# Patient Record
Sex: FEMALE | Race: BLACK OR AFRICAN AMERICAN | NOT HISPANIC OR LATINO | Employment: FULL TIME | URBAN - METROPOLITAN AREA
[De-identification: names, ages, dates, MRNs, and addresses within clinical notes are randomized per-mention and may not be internally consistent; named-entity substitution may affect disease eponyms.]

---

## 2023-03-09 LAB
ACTIVATED PARTIAL THROMBOPLASTIN TIME IN PPP BY COAGULATION ASSAY: 28 SEC (ref 26–39)
ALANINE AMINOTRANSFERASE (SGPT) (U/L) IN SER/PLAS: 10 U/L (ref 7–45)
ALBUMIN (G/DL) IN SER/PLAS: 3.8 G/DL (ref 3.4–5)
ALKALINE PHOSPHATASE (U/L) IN SER/PLAS: 65 U/L (ref 33–110)
ANION GAP IN SER/PLAS: 8 MMOL/L (ref 10–20)
ASPARTATE AMINOTRANSFERASE (SGOT) (U/L) IN SER/PLAS: 12 U/L (ref 9–39)
BASOPHILS (10*3/UL) IN BLOOD BY AUTOMATED COUNT: 0.01 X10E9/L (ref 0–0.1)
BASOPHILS/100 LEUKOCYTES IN BLOOD BY AUTOMATED COUNT: 0.2 % (ref 0–2)
BILIRUBIN TOTAL (MG/DL) IN SER/PLAS: 0.3 MG/DL (ref 0–1.2)
CALCIUM (MG/DL) IN SER/PLAS: 9 MG/DL (ref 8.6–10.3)
CARBON DIOXIDE, TOTAL (MMOL/L) IN SER/PLAS: 30 MMOL/L (ref 21–32)
CHLORIDE (MMOL/L) IN SER/PLAS: 102 MMOL/L (ref 98–107)
CREATININE (MG/DL) IN SER/PLAS: 0.66 MG/DL (ref 0.5–1.05)
EOSINOPHILS (10*3/UL) IN BLOOD BY AUTOMATED COUNT: 0.06 X10E9/L (ref 0–0.7)
EOSINOPHILS/100 LEUKOCYTES IN BLOOD BY AUTOMATED COUNT: 1 % (ref 0–6)
ERYTHROCYTE DISTRIBUTION WIDTH (RATIO) BY AUTOMATED COUNT: 15.1 % (ref 11.5–14.5)
ERYTHROCYTE MEAN CORPUSCULAR HEMOGLOBIN CONCENTRATION (G/DL) BY AUTOMATED: 31.6 G/DL (ref 32–36)
ERYTHROCYTE MEAN CORPUSCULAR VOLUME (FL) BY AUTOMATED COUNT: 80 FL (ref 80–100)
ERYTHROCYTES (10*6/UL) IN BLOOD BY AUTOMATED COUNT: 4.76 X10E12/L (ref 4–5.2)
GFR FEMALE: >90 ML/MIN/1.73M2
GLUCOSE (MG/DL) IN SER/PLAS: 94 MG/DL (ref 74–99)
HEMATOCRIT (%) IN BLOOD BY AUTOMATED COUNT: 38.3 % (ref 36–46)
HEMOGLOBIN (G/DL) IN BLOOD: 12.1 G/DL (ref 12–16)
IMMATURE GRANULOCYTES/100 LEUKOCYTES IN BLOOD BY AUTOMATED COUNT: 0.2 % (ref 0–0.9)
INR IN PPP BY COAGULATION ASSAY: 1.1 (ref 0.9–1.1)
LEUKOCYTES (10*3/UL) IN BLOOD BY AUTOMATED COUNT: 5.8 X10E9/L (ref 4.4–11.3)
LYMPHOCYTES (10*3/UL) IN BLOOD BY AUTOMATED COUNT: 2.89 X10E9/L (ref 1.2–4.8)
LYMPHOCYTES/100 LEUKOCYTES IN BLOOD BY AUTOMATED COUNT: 49.5 % (ref 13–44)
MONOCYTES (10*3/UL) IN BLOOD BY AUTOMATED COUNT: 0.61 X10E9/L (ref 0.1–1)
MONOCYTES/100 LEUKOCYTES IN BLOOD BY AUTOMATED COUNT: 10.4 % (ref 2–10)
NEUTROPHILS (10*3/UL) IN BLOOD BY AUTOMATED COUNT: 2.26 X10E9/L (ref 1.2–7.7)
NEUTROPHILS/100 LEUKOCYTES IN BLOOD BY AUTOMATED COUNT: 38.7 % (ref 40–80)
NRBC (PER 100 WBCS) BY AUTOMATED COUNT: 0 /100 WBC (ref 0–0)
PLATELETS (10*3/UL) IN BLOOD AUTOMATED COUNT: 276 X10E9/L (ref 150–450)
POTASSIUM (MMOL/L) IN SER/PLAS: 3.3 MMOL/L (ref 3.5–5.3)
PROTEIN TOTAL: 7.1 G/DL (ref 6.4–8.2)
PROTHROMBIN TIME (PT) IN PPP BY COAGULATION ASSAY: 12.8 SEC (ref 9.8–13.4)
SODIUM (MMOL/L) IN SER/PLAS: 137 MMOL/L (ref 136–145)
UREA NITROGEN (MG/DL) IN SER/PLAS: 11 MG/DL (ref 6–23)

## 2023-03-23 ENCOUNTER — HOSPITAL ENCOUNTER (OUTPATIENT)
Dept: DATA CONVERSION | Facility: HOSPITAL | Age: 48
End: 2023-03-23
Attending: STUDENT IN AN ORGANIZED HEALTH CARE EDUCATION/TRAINING PROGRAM | Admitting: STUDENT IN AN ORGANIZED HEALTH CARE EDUCATION/TRAINING PROGRAM
Payer: COMMERCIAL

## 2023-03-23 DIAGNOSIS — M23.40 LOOSE BODY IN KNEE, UNSPECIFIED KNEE: ICD-10-CM

## 2023-03-23 DIAGNOSIS — I10 ESSENTIAL (PRIMARY) HYPERTENSION: ICD-10-CM

## 2023-03-23 DIAGNOSIS — S83.272A COMPLEX TEAR OF LATERAL MENISCUS, CURRENT INJURY, LEFT KNEE, INITIAL ENCOUNTER: ICD-10-CM

## 2023-03-23 DIAGNOSIS — M23.42 LOOSE BODY IN KNEE, LEFT KNEE: ICD-10-CM

## 2023-03-23 DIAGNOSIS — S83.242A OTHER TEAR OF MEDIAL MENISCUS, CURRENT INJURY, LEFT KNEE, INITIAL ENCOUNTER: ICD-10-CM

## 2023-03-23 DIAGNOSIS — E66.9 OBESITY, UNSPECIFIED: ICD-10-CM

## 2023-03-23 LAB — HCG, URINE: NEGATIVE

## 2023-09-08 VITALS — BODY MASS INDEX: 39.52 KG/M2 | HEIGHT: 64 IN | WEIGHT: 231.48 LBS

## 2023-10-02 NOTE — OP NOTE
PROCEDURE DETAILS    Preoperative Diagnosis:  Loose body in knee, M23.40    Postoperative Diagnosis:  Left knee multiple loose bodies, medial and lateral partial meniscus tears  Surgeon: Sawyer Clinton III  Resident/Fellow/Other Assistant: Northeim, Rylee    Procedure:  1.  Left partial medial and lateral arthroscopic meniscectomy  2.  Left patellofemoral chondroplasty arthroscopic  3.  Left arthroscopic loose body removal    Anesthesia: No anesthesiologist associated with this case  Estimated Blood Loss: Minimal  Findings: See operative note        Operative Report:   Indications: The patient is a 48-year-old female with a history of knee pain which has been unresponsive to conservative management. They were seen in clinic.  An MRI was obtained which revealed multiple loose bodies, diffuse cartilage thinning about the knee.  We discussed continuing nonoperative management versus operative management. The patient elected to proceed with operative management. For detailed discussion  of risks, benefits, and alternatives, please see the orthopedic clinic notes.    We reviewed today the usual risks of arthroscopy, including bleeding, damage to neurovascular structures, postoperative stiffness, persistent pain, degenerative joint changes which may be progressive and require further treatment, wound healing complications,  infection and development of a new or exacerbation of an existing medical comorbidity. We reviewed specifically the signs and symptoms of venous thromboembolic disease.     DESCRIPTION OF PROCEDURE:       On the date of surgery, the patient was identified in the preoperative holding area.  Surgical site was agreed upon, confirmed, and marked by the surgery team, nursing staff and the patient themself.  I marked the operative side. They were taken to the  operating room and a surgical time-out was performed. They were positioned supine on the operating table with attention paid to  padding all bony prominences. An anesthetic was administered by anesthesia staff. The limb was prepped and draped in the usual  sterile fashion after a tourniquet was applied over soft padding. They received antibiotic prophylaxis within 30 minutes of incision and mechanical DVT prophylaxis to the nonoperative leg.  Attention was first turned to the diagnostic portion of the procedure.    Examination under anesthesia was performed which revealed stable exam to anterior and posterior drawer, Lachman, pivot shift and varus and valgus stress. There was full range of motion.    Diagnostic arthroscopy was then undertaken. The tourniquet was inflated and portal sites were marked utilizing anatomic landmarks.  A lateral viewing portal was established and then a medial working portal was established under direct visualization.   A probe was introduced and all structures were thoroughly probed and evaluated for pathology. Results of the diagnostic arthroscopy are as follows:      Suprapatellar pouch synovitis, multiple loose bodies  Patella  grade 3/4 Outerbridge changes  Trochlea grade 3 Outerbridge changes  Medial femoral condyle grade 3 Outerbridge change  Medial tibial plateau grade 4 Outerbridge  Lateral femoral condyle grade 3  Lateral tibial plateau grade 2  Medial meniscus white zone tear involving posterior horn  Lateral meniscus periroot tear involving posterior horn complex chronic in nature  Medial gutter normal  Lateral gutter normal  Notch synovitis  ACL normal   PCL normal  Posterior knee no loose bodies    Attention was then turned to the therapeutic portion of the arthroscopic procedure.    The medial meniscus was noted to have a posterior horn tear involving the the white zone.   Using a shaver and meniscal biters, we debrided the meniscus back until we obtained healthy and stable margins.  The meniscectomy extended ~20% % to the periphery  in the affected area.  A probe was then used to ensure all free  edges are appropriately cleaned/resected.    The lateral meniscus was noted to have a periroot tear involving the posterior horn, this was complex and quite macerated in nature.   Using a shaver and meniscal biters, we debrided the meniscus back until we obtained healthy and stable margins.  The  meniscectomy extended ~40% % to the periphery in the affected area.  A probe was then used to ensure all free edges are appropriately cleaned/resected.     Chondroplasty was performed with a mechanized shaver of the chondral damage noted above.     Attention was turned to the inflamed/hypertrophic synovium of the notch, the anterior-medial and anterior- lateral compartments, and the suprapatellar pouch, and this was thoroughly debrided with a shaver.     Multiple loose bodies were removed with combination of arthroscopic grasper as well as shaver.    The knee was copiously lavaged.  The arthroscope was removed.  The portals were closed with 3-0 inverted figure-of-eight sutures.  Xeroform and a sterile dressing were placed.  The tourniquet was deflated after application of an Ace wrap for compression.   The patient was awakened from anesthesia and taken to recovery room in good condition.    The physician assistant was present for the entire case.  Given the nature of the procedure and disease process a skilled surgical assistant was necessary for the case.  The assistant was necessary for retraction and helped directly facilitate completion  of the surgery.  A certified scrub tech was at the back table managing instruments and supplies for the surgical procedure.      POSTOPERATIVE PLAN:   Date of discharge protocol with narcotics.    Early ambulation and mechanical compression for DVT prevention.  Follow up in clinic in 2 weeks for incision check and to review arthroscopic findings.  Weight bear as tolerated. Crutches for ambulation assistance.                         Attestation:   Note Completion:  Attending Attestation  I performed the procedure without a resident         Electronic Signatures:  Sawyer Clinton III)  (Signed 23-Mar-2023 09:46)   Authored: Post-Operative Note, Chart Review, Note Completion      Last Updated: 23-Mar-2023 09:46 by Sawyer Clinton III)

## 2023-10-13 ENCOUNTER — LAB (OUTPATIENT)
Dept: LAB | Facility: LAB | Age: 48
End: 2023-10-13
Payer: COMMERCIAL

## 2023-10-13 DIAGNOSIS — E55.9 VITAMIN D DEFICIENCY, UNSPECIFIED: ICD-10-CM

## 2023-10-13 DIAGNOSIS — R73.01 IMPAIRED FASTING GLUCOSE: ICD-10-CM

## 2023-10-13 DIAGNOSIS — R73.01 IMPAIRED FASTING GLUCOSE: Primary | ICD-10-CM

## 2023-10-13 LAB
25(OH)D3 SERPL-MCNC: 27 NG/ML (ref 30–100)
EST. AVERAGE GLUCOSE BLD GHB EST-MCNC: 128 MG/DL
HBA1C MFR BLD: 6.1 %

## 2023-10-13 PROCEDURE — 36415 COLL VENOUS BLD VENIPUNCTURE: CPT

## 2023-10-13 PROCEDURE — 82306 VITAMIN D 25 HYDROXY: CPT

## 2023-10-13 PROCEDURE — 83036 HEMOGLOBIN GLYCOSYLATED A1C: CPT

## 2023-11-09 ENCOUNTER — APPOINTMENT (OUTPATIENT)
Dept: ORTHOPEDIC SURGERY | Facility: CLINIC | Age: 48
End: 2023-11-09
Payer: COMMERCIAL

## 2023-12-01 ENCOUNTER — OFFICE VISIT (OUTPATIENT)
Dept: ORTHOPEDIC SURGERY | Facility: CLINIC | Age: 48
End: 2023-12-01
Payer: COMMERCIAL

## 2023-12-01 VITALS — HEIGHT: 64 IN | WEIGHT: 231 LBS | BODY MASS INDEX: 39.44 KG/M2

## 2023-12-01 DIAGNOSIS — M17.12 PRIMARY OSTEOARTHRITIS OF LEFT KNEE: Primary | ICD-10-CM

## 2023-12-01 PROCEDURE — 20610 DRAIN/INJ JOINT/BURSA W/O US: CPT | Performed by: STUDENT IN AN ORGANIZED HEALTH CARE EDUCATION/TRAINING PROGRAM

## 2023-12-01 PROCEDURE — 2500000004 HC RX 250 GENERAL PHARMACY W/ HCPCS (ALT 636 FOR OP/ED): Performed by: STUDENT IN AN ORGANIZED HEALTH CARE EDUCATION/TRAINING PROGRAM

## 2023-12-01 PROCEDURE — 2500000005 HC RX 250 GENERAL PHARMACY W/O HCPCS: Performed by: STUDENT IN AN ORGANIZED HEALTH CARE EDUCATION/TRAINING PROGRAM

## 2023-12-01 PROCEDURE — 1036F TOBACCO NON-USER: CPT | Performed by: STUDENT IN AN ORGANIZED HEALTH CARE EDUCATION/TRAINING PROGRAM

## 2023-12-01 PROCEDURE — 99214 OFFICE O/P EST MOD 30 MIN: CPT | Performed by: STUDENT IN AN ORGANIZED HEALTH CARE EDUCATION/TRAINING PROGRAM

## 2023-12-01 PROCEDURE — 3008F BODY MASS INDEX DOCD: CPT | Performed by: STUDENT IN AN ORGANIZED HEALTH CARE EDUCATION/TRAINING PROGRAM

## 2023-12-01 RX ORDER — LIDOCAINE HYDROCHLORIDE 10 MG/ML
4 INJECTION INFILTRATION; PERINEURAL
Status: COMPLETED | OUTPATIENT
Start: 2023-12-01 | End: 2023-12-01

## 2023-12-01 RX ORDER — TRIAMCINOLONE ACETONIDE 40 MG/ML
40 INJECTION, SUSPENSION INTRA-ARTICULAR; INTRAMUSCULAR
Status: COMPLETED | OUTPATIENT
Start: 2023-12-01 | End: 2023-12-01

## 2023-12-01 RX ADMIN — TRIAMCINOLONE ACETONIDE 40 MG: 40 INJECTION, SUSPENSION INTRA-ARTICULAR; INTRAMUSCULAR at 09:09

## 2023-12-01 RX ADMIN — LIDOCAINE HYDROCHLORIDE 4 ML: 10 INJECTION, SOLUTION INFILTRATION; PERINEURAL at 09:09

## 2023-12-01 ASSESSMENT — PAIN - FUNCTIONAL ASSESSMENT: PAIN_FUNCTIONAL_ASSESSMENT: NO/DENIES PAIN

## 2023-12-01 ASSESSMENT — PATIENT HEALTH QUESTIONNAIRE - PHQ9
2. FEELING DOWN, DEPRESSED OR HOPELESS: NOT AT ALL
SUM OF ALL RESPONSES TO PHQ9 QUESTIONS 1 AND 2: 0
1. LITTLE INTEREST OR PLEASURE IN DOING THINGS: NOT AT ALL

## 2023-12-01 NOTE — LETTER
December 1, 2023     Patient: Sara Almanzar   YOB: 1975   Date of Visit: 12/1/2023       To Whom It May Concern:    It is my medical opinion that Sara Almanzar may return to full duty immediately with no restrictions.    If you have any questions or concerns, please don't hesitate to call, [phone].         Sincerely,        Sawyer Clinton MD

## 2023-12-01 NOTE — PROGRESS NOTES
Chief Complaint   Patient presents with    Left Knee - Follow-up     1. Left knee arthroscopy partial medial meniscectomy   DOS- 3/23/2023 (8 months, 8 days)       HPI  Patient presents today for follow up of left knee.  Patient states that she has been having some stiffness about her knee as of late.  Particular when sitting for prolonged periods of time feels like she has some tightness in the back of her knee.  Presents today for orthopedic evaluation and treatment.  This had an unremarkable postoperative course well-known to me status post partial medial meniscectomy date of surgery 3/23/2023.      Physical exam  General: Alert and oriented to place, person, and time.  No acute distress and breathing comfortably; pleasant and cooperative with the examination.  Extremity:  Left knee:  Skin healthy and intact  No gross swelling or ecchymosis  Alignment: Neutral  Effusion: Mild  ROM: Full  Crepitance with range of motion  No pain with internal rotation of the hip  Tenderness to palpation: Posterior medial knee     No laxity to valgus stress  No laxity to varus stress  Negative Lachman´s test  Negative posterior drawer test  Mild pain with Rema´s test     Neurovascular exam normal distally  2+ DP pulse and good cap refill    Diagnostics:  No results found.     Procedures  L Inj/Asp: L knee on 12/1/2023 9:09 AM  Indications: pain  Details: 22 G needle, anterolateral approach  Medications: 40 mg triamcinolone acetonide 40 mg/mL; 4 mL lidocaine 10 mg/mL (1 %)  Consent was given by the patient. Immediately prior to procedure a time out was called to verify the correct patient, procedure, equipment, support staff and site/side marked as required. Patient was prepped and draped in the usual sterile fashion.            Assessment:  48-year-old female with mild to moderate knee arthritis    Treatment plan:  Will proceed with a intra-articular injection today  Follow-up in 2 months time  If fails to improve symptoms  will likely precertified her for gel injections  X-rays at follow-up 4 weightbearing views left knee  All of the patient's questions concerns answered    Discussed with patient that these symptoms that she you are having are likely secondary to arthritic change as demonstrated on arthroscopic pictures particular at the follow-up patellofemoral, apartment as well as medial compartment.  Eventually down the road may need a total knee replacement will likely push this off as long as possible.

## 2023-12-18 ENCOUNTER — OFFICE VISIT (OUTPATIENT)
Dept: ORTHOPEDIC SURGERY | Facility: CLINIC | Age: 48
End: 2023-12-18
Payer: COMMERCIAL

## 2023-12-18 ENCOUNTER — ANCILLARY PROCEDURE (OUTPATIENT)
Dept: RADIOLOGY | Facility: CLINIC | Age: 48
End: 2023-12-18
Payer: COMMERCIAL

## 2023-12-18 DIAGNOSIS — M25.532 BILATERAL WRIST PAIN: ICD-10-CM

## 2023-12-18 DIAGNOSIS — M25.532 LEFT WRIST PAIN: ICD-10-CM

## 2023-12-18 DIAGNOSIS — M25.531 BILATERAL WRIST PAIN: ICD-10-CM

## 2023-12-18 PROCEDURE — 2500000005 HC RX 250 GENERAL PHARMACY W/O HCPCS: Performed by: STUDENT IN AN ORGANIZED HEALTH CARE EDUCATION/TRAINING PROGRAM

## 2023-12-18 PROCEDURE — L3908 WHO COCK-UP NONMOLDE PRE OTS: HCPCS | Performed by: STUDENT IN AN ORGANIZED HEALTH CARE EDUCATION/TRAINING PROGRAM

## 2023-12-18 PROCEDURE — 2500000004 HC RX 250 GENERAL PHARMACY W/ HCPCS (ALT 636 FOR OP/ED): Performed by: STUDENT IN AN ORGANIZED HEALTH CARE EDUCATION/TRAINING PROGRAM

## 2023-12-18 PROCEDURE — L3809 WHFO W/O JOINTS PRE OTS: HCPCS | Performed by: STUDENT IN AN ORGANIZED HEALTH CARE EDUCATION/TRAINING PROGRAM

## 2023-12-18 PROCEDURE — 73110 X-RAY EXAM OF WRIST: CPT | Mod: 50

## 2023-12-18 PROCEDURE — 73110 X-RAY EXAM OF WRIST: CPT | Mod: BILATERAL PROCEDURE | Performed by: STUDENT IN AN ORGANIZED HEALTH CARE EDUCATION/TRAINING PROGRAM

## 2023-12-18 RX ORDER — LIDOCAINE HYDROCHLORIDE 10 MG/ML
1 INJECTION INFILTRATION; PERINEURAL
Status: COMPLETED | OUTPATIENT
Start: 2023-12-18 | End: 2023-12-18

## 2023-12-18 RX ADMIN — LIDOCAINE HYDROCHLORIDE 1 ML: 10 INJECTION, SOLUTION INFILTRATION; PERINEURAL at 10:01

## 2023-12-18 RX ADMIN — TRIAMCINOLONE ACETONIDE 10 MG: 10 INJECTION, SUSPENSION INTRA-ARTICULAR; INTRALESIONAL at 10:01

## 2023-12-18 NOTE — PROGRESS NOTES
History of Present Illness:  Presents for evaluation of left wrist. The symptoms have been present for months.  The patient denies any inciting trauma. The pain is localized about the radial side of the wrist. It is described as moderate. The pain occurs intermittantly and is worse with activity. The patient presents due to persistent symptoms even with activity modification.    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  Additionally today she is complaining of right-sided numbness and tingling to the radial digits.  This does not wake her up at night.    Review of Systems   GENERAL: Negative for malaise, significant weight loss, fever  MUSCULOSKELETAL: see HPI  NEURO:  Negative    The patient's past medical history, family history, social history, and review of systems were reviewed. History is otherwise negative except as stated in the HPI.    Physical Examination:  General: Alert and oriented to person, place, and time.  No acute distress and breathing comfortably: Pleasant and cooperative with examination.  HEENT: Head is normocephalic and atraumatic.  Neck: Supple, no visible swelling.  Cardiovascular: No palpable tachycardia  Lungs: No audible wheezing or labored breathing  Abdomen: Nondistended.  On musculoskeletal examination on the left the patient has full elbow range of motion. In regards to the wrist, there is no obvious deformity. Range of motion is full in flexion, extension, pronation, and supination.  Strength is 5/5 in flexion and extension.  There is tenderness to palpation of the 1st dorsal compartment with a positive Finkelstein maneuver and positive flexion-abduction test. There is no tenderness to palpation about the thumb CMC joint, the SL interval, or the TFCC. Sensation and motor function are intact in the radial, ulnar, and median nerve distribution. There is no obvious thenar or intrinsic atrophy. All fingers are without triggering and are without pain over the A1  pulley.  The patient can make a full composite fist. The hand itself is warm and well perfused. The skin is intact throughout.     Musculoskeletal evaluation on the right.  The patient has full elbow range of motion.  In regards to the wrist there is no obvious deformity.  Positive Shyann's and Phalen's.  No tenderness palpation about the thumb CMC, SL interval, or TFCC.  Sensation intact light touch in the radial ulnar median nerve distribution.  No obvious thenar or intrinsic atrophy.  All fingers are without triggering.  Can make a full composite fist.  Hand is warm well-perfused throughout.      Imaging:  AP, lateral, and oblique radiographs of the left wrist were ordered and reviewed. These reveal excellent alignment with minimal arthritis.    AP lateral and oblique of the right wrist demonstrate no acute osseous findings.    Assessment:  Patient with left DeQuarvain tenosynovitis and right carpal tunnel    Hand / UE Inj/Asp: L extensor compartment 1 for de Quervain's tenosynovitis on 12/18/2023 10:01 AM  Indications: pain  Details: 24 G needle, volar approach  Medications: 10 mg triamcinolone acetonide 10 mg/mL; 1 mL lidocaine 10 mg/mL (1 %)  Outcome: tolerated well, no immediate complications  Procedure, treatment alternatives, risks and benefits explained, specific risks discussed. Immediately prior to procedure a time out was called to verify the correct patient, procedure, equipment, support staff and site/side marked as required. Patient was prepped and draped in the usual sterile fashion.             Plan:   Injection. I had a long discussion with the patient regarding the diagnosis of DeQuervains tenosynovitis and the risks/benefits/expected outcomes of various treatment options.  At this point, the patient elected non-operative treatment consisting of activity modification +/- splinting and a corticosteroid injection. I reviewed the specific risks of injection which include, but are not limited to,  infection, bleeding, pain, steroid flare, glycemic alteration, subcutaneous fat atrophy, skin hypopigmentation, soft tissue damage, and incomplete symptom relief. The patient consented to the injection, and then using sterile technique, I injected a 1mL combination of Kenalog  and 1% lidocaine into the first dorsal compartment. The injection site was dressed, and the patient tolerated the injection well. Finally, I have emphasized patience, as any benefit may take some time to manifest.     With regards to the right carpal tunnel I provided her a carpal tunnel brace.  I would like her to wear this at night.    She will follow-up in 2 months for clinical exam.        Follow up:  2 months      Shira Clinton MD  Orthopaedic Surgeon

## 2023-12-20 ENCOUNTER — APPOINTMENT (OUTPATIENT)
Dept: RADIOLOGY | Facility: CLINIC | Age: 48
End: 2023-12-20
Payer: COMMERCIAL

## 2023-12-21 ENCOUNTER — ANCILLARY PROCEDURE (OUTPATIENT)
Dept: RADIOLOGY | Facility: CLINIC | Age: 48
End: 2023-12-21
Payer: COMMERCIAL

## 2023-12-21 DIAGNOSIS — Z12.31 SCREENING MAMMOGRAM FOR BREAST CANCER: ICD-10-CM

## 2023-12-21 PROCEDURE — 77067 SCR MAMMO BI INCL CAD: CPT | Performed by: RADIOLOGY

## 2023-12-21 PROCEDURE — 77063 BREAST TOMOSYNTHESIS BI: CPT | Performed by: RADIOLOGY

## 2023-12-21 PROCEDURE — 77067 SCR MAMMO BI INCL CAD: CPT

## 2023-12-22 ENCOUNTER — APPOINTMENT (OUTPATIENT)
Dept: RADIOLOGY | Facility: CLINIC | Age: 48
End: 2023-12-22
Payer: COMMERCIAL

## 2023-12-22 DIAGNOSIS — Z12.31 SCREENING MAMMOGRAM FOR BREAST CANCER: ICD-10-CM

## 2024-02-02 ENCOUNTER — APPOINTMENT (OUTPATIENT)
Dept: ORTHOPEDIC SURGERY | Facility: CLINIC | Age: 49
End: 2024-02-02
Payer: COMMERCIAL

## 2024-02-12 ENCOUNTER — APPOINTMENT (OUTPATIENT)
Dept: ORTHOPEDIC SURGERY | Facility: CLINIC | Age: 49
End: 2024-02-12
Payer: COMMERCIAL

## 2024-02-23 ENCOUNTER — OFFICE VISIT (OUTPATIENT)
Dept: ORTHOPEDIC SURGERY | Facility: CLINIC | Age: 49
End: 2024-02-23
Payer: COMMERCIAL

## 2024-02-23 DIAGNOSIS — M25.532 LEFT WRIST PAIN: Primary | ICD-10-CM

## 2024-02-23 PROCEDURE — 3008F BODY MASS INDEX DOCD: CPT | Performed by: STUDENT IN AN ORGANIZED HEALTH CARE EDUCATION/TRAINING PROGRAM

## 2024-02-23 PROCEDURE — 99213 OFFICE O/P EST LOW 20 MIN: CPT | Performed by: STUDENT IN AN ORGANIZED HEALTH CARE EDUCATION/TRAINING PROGRAM

## 2024-02-23 PROCEDURE — 1036F TOBACCO NON-USER: CPT | Performed by: STUDENT IN AN ORGANIZED HEALTH CARE EDUCATION/TRAINING PROGRAM

## 2024-02-23 NOTE — PROGRESS NOTES
History of Present Illness  Patient returns today for evaluation of left wrist dequervains and right CTS. She had CSI 1st dorsal compartment 12/18/23. She has been wearing brace for CTS.     No longer having left wrist pain.  Her brace has helped her significantly with her nighttime numbness and tingling but she does have some numbness and weakness during the day on the right.    Physical Examination:  LUE:  The patient appears to be their stated age, is in no apparent distress, and is oriented x3. The patients mood and affect are appropriate. The patients gait is normal. The examination of the limb in question was performed in comparison to the contralateral limb.    On musculoskeletal examination, on the right she has positive Phalen's Durkan's Tinel's at the carpal tunnel.  5 out of 5 thenar strength.    Sensation and motor function are intact in the radial, and median nerve distribution. There is no obvious thenar atrophy, and thenar strength is 5/5. There is no intrinsic atrophy, and intrinsic strength is 5/5.  The patient can make a full composite fist. The hand itself is warm and well perfused. The skin is intact throughout. The contralateral hand/wrist are normal to inspection, range of motion, stability, and strength.      Assessment:  Patient with improved left de Quervain's tenosynovitis.  Right carpal tunnel that has improved with bracing.  She does still have daytime symptoms.  She would like to observe this.    Plan:   Follow-up in 1 month for clinical exam    Shira Clinton MD

## 2024-03-25 ENCOUNTER — OFFICE VISIT (OUTPATIENT)
Dept: ORTHOPEDIC SURGERY | Facility: CLINIC | Age: 49
End: 2024-03-25
Payer: COMMERCIAL

## 2024-03-25 DIAGNOSIS — G56.01 CARPAL TUNNEL SYNDROME OF RIGHT WRIST: Primary | ICD-10-CM

## 2024-03-25 PROCEDURE — 99214 OFFICE O/P EST MOD 30 MIN: CPT | Mod: 57 | Performed by: STUDENT IN AN ORGANIZED HEALTH CARE EDUCATION/TRAINING PROGRAM

## 2024-03-25 PROCEDURE — 1036F TOBACCO NON-USER: CPT | Performed by: STUDENT IN AN ORGANIZED HEALTH CARE EDUCATION/TRAINING PROGRAM

## 2024-03-25 PROCEDURE — 99214 OFFICE O/P EST MOD 30 MIN: CPT | Performed by: STUDENT IN AN ORGANIZED HEALTH CARE EDUCATION/TRAINING PROGRAM

## 2024-03-25 NOTE — PROGRESS NOTES
History of Present Illness  Patient returns today for evaluation of left de Quervain's resolved.  Right carpal tunnel.  She has symptoms outside of her brace.  Interested in having a carpal tunnel release..     Physical Examination:  RUE:  The patient appears to be their stated age, is in no apparent distress, and is oriented x3. The patients mood and affect are appropriate. The patients gait is normal. The examination of the limb in question was performed in comparison to the contralateral limb.    On musculoskeletal examination, positive Durkan  Phalen to the carpal tunnel    Sensation and motor function are intact in the radial, and median nerve distribution. There is no obvious thenar atrophy, and thenar strength is 5/5. There is no intrinsic atrophy, and intrinsic strength is 5/5.  The patient can make a full composite fist. The hand itself is warm and well perfused. The skin is intact throughout. The contralateral hand/wrist are normal to inspection, range of motion, stability, and strength.        Assessment:  Patient with right carpal tunnel syndrome.  Not responding to bracing.  She is interested in carpal tunnel release    Plan:   Given the duration of symptoms and the failure of non-operative treatment, I have recommended decompression. The benefit of surgery would be to stop the progression of symptoms, with the hope that the symptoms would also resolve. The risks of surgery include, but are not limited to, infection, bleeding/hematoma, nerve/vessel injury, wound dehisence, persistent symptoms, and anesthetic complications. The patient understood the risks/benefits and consented to surgery. I will schedule them in the near future. I have answered all questions regarding the surgery itself and the post-operative course.      Shira Clinton MD

## 2024-06-05 ENCOUNTER — APPOINTMENT (OUTPATIENT)
Dept: ORTHOPEDIC SURGERY | Facility: CLINIC | Age: 49
End: 2024-06-05
Payer: COMMERCIAL

## 2024-07-02 ENCOUNTER — APPOINTMENT (OUTPATIENT)
Dept: ORTHOPEDIC SURGERY | Facility: CLINIC | Age: 49
End: 2024-07-02
Payer: COMMERCIAL

## 2024-07-02 DIAGNOSIS — G56.01 CARPAL TUNNEL SYNDROME OF RIGHT WRIST: Primary | ICD-10-CM

## 2024-07-02 PROCEDURE — 1036F TOBACCO NON-USER: CPT | Performed by: STUDENT IN AN ORGANIZED HEALTH CARE EDUCATION/TRAINING PROGRAM

## 2024-07-02 PROCEDURE — 99214 OFFICE O/P EST MOD 30 MIN: CPT | Performed by: STUDENT IN AN ORGANIZED HEALTH CARE EDUCATION/TRAINING PROGRAM

## 2024-07-02 NOTE — PROGRESS NOTES
History of Present Illness  Patient returns today for evaluation Right carpal tunnel.  She has symptoms outside of her brace.  Interested in having a carpal tunnel release..  Presented today to discuss a few more questions regarding surgery    Physical Examination:  RUE:  The patient appears to be their stated age, is in no apparent distress, and is oriented x3. The patients mood and affect are appropriate. The patients gait is normal. The examination of the limb in question was performed in comparison to the contralateral limb.    On musculoskeletal examination, positive Durkan  Phalen to the carpal tunnel    Sensation and motor function are intact in the radial, and median nerve distribution. There is no obvious thenar atrophy, and thenar strength is 5/5. There is no intrinsic atrophy, and intrinsic strength is 5/5.  The patient can make a full composite fist. The hand itself is warm and well perfused. The skin is intact throughout. The contralateral hand/wrist are normal to inspection, range of motion, stability, and strength.        Assessment:  Patient with right carpal tunnel syndrome.  Not responding to bracing.  She is interested in carpal tunnel release    Plan:   Given the duration of symptoms and the failure of non-operative treatment, I have recommended decompression. The benefit of surgery would be to stop the progression of symptoms, with the hope that the symptoms would also resolve. The risks of surgery include, but are not limited to, infection, bleeding/hematoma, nerve/vessel injury, wound dehisence, persistent symptoms, and anesthetic complications. The patient understood the risks/benefits and consented to surgery. I will schedule them in the near future. I have answered all questions regarding the surgery itself and the post-operative course.    I anticipate that she will be out of work for 3 weeks following surgery.  Work note provided today      Shira Clinton MD

## 2024-07-02 NOTE — LETTER
July 2, 2024     Patient: Sara Almanzar   YOB: 1975   Date of Visit: 7/2/2024       To Whom It May Concern:    It is my medical opinion that Sara Almanzar  due to numbness and tingling needs to limit TB testing administration .  Please anticipate out work for 3 weeks following carpal tunnel release in August.   If you have any questions or concerns, please don't hesitate to call.         Sincerely,        Shira Hoyt MD

## 2024-08-16 DIAGNOSIS — G56.01 CARPAL TUNNEL SYNDROME OF RIGHT WRIST: Primary | ICD-10-CM

## 2024-08-16 PROCEDURE — 64721 CARPAL TUNNEL SURGERY: CPT | Performed by: STUDENT IN AN ORGANIZED HEALTH CARE EDUCATION/TRAINING PROGRAM

## 2024-08-16 RX ORDER — IBUPROFEN 800 MG/1
800 TABLET ORAL 3 TIMES DAILY
Qty: 15 TABLET | Refills: 0 | Status: SHIPPED | OUTPATIENT
Start: 2024-08-16 | End: 2024-08-21

## 2024-08-16 RX ORDER — HYDROCODONE BITARTRATE AND ACETAMINOPHEN 5; 325 MG/1; MG/1
1 TABLET ORAL EVERY 6 HOURS PRN
Qty: 20 TABLET | Refills: 0 | Status: SHIPPED | OUTPATIENT
Start: 2024-08-16 | End: 2024-08-23

## 2024-08-16 RX ORDER — TRAMADOL HYDROCHLORIDE 50 MG/1
50 TABLET ORAL EVERY 8 HOURS PRN
Qty: 5 TABLET | Refills: 0 | Status: SHIPPED | OUTPATIENT
Start: 2024-08-16 | End: 2024-08-19

## 2024-08-16 NOTE — PROGRESS NOTES
Rx sent      CARPAL TUNNEL RELEASE- LOCAL  PREOPERATIVE DIAGNOSIS:   RIGHT Carpal Tunnel Syndrome  POSTOPERATIVE DIAGNOSIS: RIGHT Carpal Tunnel Syndrome  PROCEDURE:                              RIGHT Open Carpal Tunnel Release ([id number])    SURGEON:                                    ТАТЬЯНА GROVE MD    ANESTHESIA:                              Local.    COMPLICATIONS:                     None.    Patient Name: Sara Almanzar  MRN: [id number]  8/16/2024    Estimated Blood Loss: 0ml    INDICATIONS FOR SURGERY:    The patient has been treated for on-going carpal tunnel syndrome that has failed non-operative treatment. Surgical release was offered. Potential benefits include relief of paraesthesias and preventing progression of nerve dysfunction. Risks include bleeding, infection, nerve injury, tendon injury, persistent numbness & paraesthesias, and pillar pain. The patient understood and consented willingly to surgery. The patient also understood the recovery and rehabilitation process.    DESCRIPTION OF PROCEDURE:  The patient was brought to the operating room and identified by the surgical staff. The operative limb was also confirmed by the surgical staff. The operative site in the hand was injected with a local anesthetic for ara-operative pain control. Next, the median nerve in the wrist was injected with a local anesthestic for post-operative pain control. There was no anesthesiology staff present nor any administration of anesthetics through an IV during the case.  The operative limb was then prepped and draped in standard sterile fashion. A 2cm longitudinal incision was placed in the inter-thenar valley of the hand in line with the third webspace. Diligent hemostasis was maintained with bipolar cautery. The superficial palmar fascia was cut in line with the skin incision. The transverse carpal ligament was identified. Once satisfied that the recurrent motor branch was not traversing the ligament it was released  completely distally to the sentinel pad of fat and proximally past the wrist crease into the forearm. Once satisfied that the carpal tunnel and median nerve was completely decompressed the wound was washed. The incision was closed. A soft dressing was applied. The patient was taken to the recovery in stable condition.  I was present for the entire length of the case.     Shira Hoyt MD

## 2024-08-30 ENCOUNTER — OFFICE VISIT (OUTPATIENT)
Dept: ORTHOPEDIC SURGERY | Facility: CLINIC | Age: 49
End: 2024-08-30
Payer: COMMERCIAL

## 2024-08-30 ENCOUNTER — APPOINTMENT (OUTPATIENT)
Dept: ORTHOPEDIC SURGERY | Facility: CLINIC | Age: 49
End: 2024-08-30
Payer: COMMERCIAL

## 2024-08-30 DIAGNOSIS — G56.01 CARPAL TUNNEL SYNDROME OF RIGHT WRIST: Primary | ICD-10-CM

## 2024-08-30 PROCEDURE — 99211 OFF/OP EST MAY X REQ PHY/QHP: CPT | Performed by: STUDENT IN AN ORGANIZED HEALTH CARE EDUCATION/TRAINING PROGRAM

## 2024-08-30 NOTE — PROGRESS NOTES
2 weeks S/p RCTR. Doing well. Denies numbness or tingling.     Physical Examination:  The patient appears to be their stated age, is in no apparent distress, and is oriented x3. The patients mood and affect are appropriate. The patients gait is normal. The examination of the limb in question was performed in comparison to the contralateral limb.    Incision c/d/I  AIN, PIN, ulnar intact  SILT A/R/U/M  Hand wwp    Assessment:  2 weeks post op    Plan:   The patient will progress to weightbearing to tolerance with theupper extremity.  We reviewed range of motion recovery exercises to the digits and wrist, scar massage and desensitization techniques.  The patient will work on these on her own with home exercise program.  Follow up with our office on an as-needed basis.  We did offer a formal therapy referral.  They declined in favor of home exercise program.   The patient verbalized agreement and understanding of plan for care.  All questions were answered at today's visit.        RTW note provided    Shira Clinton MD

## 2024-08-30 NOTE — LETTER
August 30, 2024     Patient: Sara Almanzar   YOB: 1975   Date of Visit: 8/30/2024       To Whom It May Concern:    It is my medical opinion that Sara Almanzar may return to work on 9/16/24 with no restrictions .    If you have any questions or concerns, please don't hesitate to call.         Sincerely,        Shira Hoyt MD

## 2024-10-23 ENCOUNTER — LAB (OUTPATIENT)
Dept: LAB | Facility: LAB | Age: 49
End: 2024-10-23
Payer: COMMERCIAL

## 2024-10-23 DIAGNOSIS — I10 ESSENTIAL (PRIMARY) HYPERTENSION: Primary | ICD-10-CM

## 2024-10-23 DIAGNOSIS — R73.01 IMPAIRED FASTING GLUCOSE: ICD-10-CM

## 2024-10-23 DIAGNOSIS — Z13.220 ENCOUNTER FOR SCREENING FOR LIPOID DISORDERS: ICD-10-CM

## 2024-10-23 LAB
ALBUMIN SERPL BCP-MCNC: 3.8 G/DL (ref 3.4–5)
ALP SERPL-CCNC: 85 U/L (ref 33–110)
ALT SERPL W P-5'-P-CCNC: 13 U/L (ref 7–45)
ANION GAP SERPL CALC-SCNC: 10 MMOL/L (ref 10–20)
AST SERPL W P-5'-P-CCNC: 14 U/L (ref 9–39)
BILIRUB SERPL-MCNC: 0.4 MG/DL (ref 0–1.2)
BUN SERPL-MCNC: 15 MG/DL (ref 6–23)
CALCIUM SERPL-MCNC: 9 MG/DL (ref 8.6–10.3)
CHLORIDE SERPL-SCNC: 104 MMOL/L (ref 98–107)
CHOLEST SERPL-MCNC: 160 MG/DL (ref 0–199)
CHOLESTEROL/HDL RATIO: 3.2
CO2 SERPL-SCNC: 30 MMOL/L (ref 21–32)
CREAT SERPL-MCNC: 0.84 MG/DL (ref 0.5–1.05)
EGFRCR SERPLBLD CKD-EPI 2021: 84 ML/MIN/1.73M*2
ERYTHROCYTE [DISTWIDTH] IN BLOOD BY AUTOMATED COUNT: 15.5 % (ref 11.5–14.5)
EST. AVERAGE GLUCOSE BLD GHB EST-MCNC: 128 MG/DL
GLUCOSE SERPL-MCNC: 106 MG/DL (ref 74–99)
HBA1C MFR BLD: 6.1 %
HCT VFR BLD AUTO: 36.4 % (ref 36–46)
HDLC SERPL-MCNC: 50.3 MG/DL
HGB BLD-MCNC: 11.4 G/DL (ref 12–16)
LDLC SERPL CALC-MCNC: 95 MG/DL
MCH RBC QN AUTO: 25.7 PG (ref 26–34)
MCHC RBC AUTO-ENTMCNC: 31.3 G/DL (ref 32–36)
MCV RBC AUTO: 82 FL (ref 80–100)
NON HDL CHOLESTEROL: 110 MG/DL (ref 0–149)
NRBC BLD-RTO: 0 /100 WBCS (ref 0–0)
PLATELET # BLD AUTO: 282 X10*3/UL (ref 150–450)
POTASSIUM SERPL-SCNC: 4.3 MMOL/L (ref 3.5–5.3)
PROT SERPL-MCNC: 7 G/DL (ref 6.4–8.2)
RBC # BLD AUTO: 4.44 X10*6/UL (ref 4–5.2)
SODIUM SERPL-SCNC: 140 MMOL/L (ref 136–145)
TRIGL SERPL-MCNC: 76 MG/DL (ref 0–149)
VLDL: 15 MG/DL (ref 0–40)
WBC # BLD AUTO: 6.8 X10*3/UL (ref 4.4–11.3)

## 2024-10-23 PROCEDURE — 83036 HEMOGLOBIN GLYCOSYLATED A1C: CPT

## 2024-10-23 PROCEDURE — 80053 COMPREHEN METABOLIC PANEL: CPT

## 2024-10-23 PROCEDURE — 80061 LIPID PANEL: CPT

## 2024-10-23 PROCEDURE — 36415 COLL VENOUS BLD VENIPUNCTURE: CPT

## 2024-10-23 PROCEDURE — 85027 COMPLETE CBC AUTOMATED: CPT
